# Patient Record
Sex: FEMALE | URBAN - METROPOLITAN AREA
[De-identification: names, ages, dates, MRNs, and addresses within clinical notes are randomized per-mention and may not be internally consistent; named-entity substitution may affect disease eponyms.]

---

## 2019-08-12 ENCOUNTER — OFFICE VISIT (OUTPATIENT)
Dept: ORTHOPEDICS | Facility: CLINIC | Age: 74
End: 2019-08-12
Payer: MEDICARE

## 2019-08-12 VITALS — WEIGHT: 154.19 LBS | TEMPERATURE: 98 F | HEIGHT: 62 IN | BODY MASS INDEX: 28.37 KG/M2

## 2019-08-12 DIAGNOSIS — M76.72 PERONEAL TENDINITIS OF LEFT LOWER EXTREMITY: Primary | ICD-10-CM

## 2019-08-12 PROCEDURE — 99201 PR OFFICE/OUTPT VISIT,NEW,LEVL I: CPT | Mod: 25,S$GLB,, | Performed by: ORTHOPAEDIC SURGERY

## 2019-08-12 PROCEDURE — 73600 PR  X-RAY ANKLE 2 VW: ICD-10-PCS | Mod: TC,LT,S$GLB, | Performed by: ORTHOPAEDIC SURGERY

## 2019-08-12 PROCEDURE — 73600 X-RAY EXAM OF ANKLE: CPT | Mod: TC,LT,S$GLB, | Performed by: ORTHOPAEDIC SURGERY

## 2019-08-12 PROCEDURE — 20550 NJX 1 TENDON SHEATH/LIGAMENT: CPT | Mod: LT,S$GLB,, | Performed by: ORTHOPAEDIC SURGERY

## 2019-08-12 PROCEDURE — 20550 TENDON SHEATH: L ANKLE: ICD-10-PCS | Mod: LT,S$GLB,, | Performed by: ORTHOPAEDIC SURGERY

## 2019-08-12 PROCEDURE — 99201 PR OFFICE/OUTPT VISIT,NEW,LEVL I: ICD-10-PCS | Mod: 25,S$GLB,, | Performed by: ORTHOPAEDIC SURGERY

## 2019-08-12 RX ORDER — AMOXICILLIN 500 MG
CAPSULE ORAL DAILY
COMMUNITY

## 2019-08-12 RX ORDER — UBIDECARENONE 75 MG
500 CAPSULE ORAL DAILY
COMMUNITY

## 2019-08-12 NOTE — PROCEDURES
Tendon Sheath: L ankle  Date/Time: 8/12/2019 2:50 PM  Performed by: Taye Grigsby MD  Authorized by: Taye Grigsby MD     Consent Done?:  Yes (Verbal)  Timeout: prior to procedure the correct patient, procedure, and site was verified    Indications:  Pain  Timeout: prior to procedure the correct patient, procedure, and site was verified    Location:  Ankle  Site:  L ankle (Peroneal tendon sheath)  Prep: patient was prepped and draped in usual sterile fashion    Ultrasonic guidance for needle placement?: No    Needle size:  27 G  Approach:  Lateral  Medications:  10 mg triamcinolone acetonide 10 mg/mL  Patient tolerance:  Patient tolerated the procedure well with no immediate complications

## 2019-08-12 NOTE — PROGRESS NOTES
Subjective:      Patient ID: Marcy Reddy is a 74 y.o. female.    Chief Complaint: Pain and Swelling of the Left Ankle    HPI 74-year-old with a five-month history of left lateral ankle pain and swelling. This is aggravated by walking.  She has tried icing without relief    Review of Systems   Constitution: Negative for fever and weight loss.   Cardiovascular: Negative for chest pain and leg swelling.   Musculoskeletal: Positive for joint pain, joint swelling and stiffness. Negative for arthritis and muscle weakness.   Gastrointestinal: Negative for change in bowel habit.   Genitourinary: Negative for bladder incontinence and hematuria.   Neurological: Negative for focal weakness, numbness, paresthesias and sensory change.         Objective:      Examination of the left ankle shows active and passive range of motion is normal. She has tenderness with palpable swelling of the peroneal tendon sheath as it passes behind the lateral malleolus and into the base of the 5th metatarsal.  She has no pathologic laxity    Ortho/SPM Exam            Assessment:       Encounter Diagnosis   Name Primary?    Peroneal tendinitis of left lower extremity Yes          Plan:       Marcy was seen today for pain and swelling.    Diagnoses and all orders for this visit:    Peroneal tendinitis of left lower extremity     AP and lateral of the left ankle is normal.    Impression is peroneal tendinitis.  The tendon sheath is injected today.  Return 2 weeks p.r.n.

## 2020-06-15 ENCOUNTER — OFFICE VISIT (OUTPATIENT)
Dept: ORTHOPEDICS | Facility: CLINIC | Age: 75
End: 2020-06-15
Payer: MEDICARE

## 2020-06-15 DIAGNOSIS — M76.72 PERONEAL TENDINITIS OF LEFT LOWER EXTREMITY: Primary | ICD-10-CM

## 2020-06-15 PROCEDURE — 99213 PR OFFICE/OUTPT VISIT, EST, LEVL III, 20-29 MIN: ICD-10-PCS | Mod: 25,S$GLB,, | Performed by: ORTHOPAEDIC SURGERY

## 2020-06-15 PROCEDURE — 99213 OFFICE O/P EST LOW 20 MIN: CPT | Mod: 25,S$GLB,, | Performed by: ORTHOPAEDIC SURGERY

## 2020-06-15 PROCEDURE — 20550 NJX 1 TENDON SHEATH/LIGAMENT: CPT | Mod: LT,S$GLB,, | Performed by: ORTHOPAEDIC SURGERY

## 2020-06-15 PROCEDURE — 20550 TENDON SHEATH: L ANKLE: ICD-10-PCS | Mod: LT,S$GLB,, | Performed by: ORTHOPAEDIC SURGERY

## 2020-06-15 NOTE — PROCEDURES
Tendon Sheath: L ankle    Date/Time: 6/15/2020 8:15 AM  Performed by: Taye Grigsby MD  Authorized by: Taye Grigsby MD     Consent Done?:  Yes (Verbal)  Indications:  Pain  Timeout: prior to procedure the correct patient, procedure, and site was verified    Prep: patient was prepped and draped in usual sterile fashion      Local anesthesia used?: Yes    Local anesthetic:  Lidocaine 2% without epinephrine  Anesthetic total (ml):  1    Location:  Ankle  Site:  L ankle (Peroneal tendon sheath)  Needle size:  27 G  Approach:  Lateral  Medications:  10 mg triamcinolone acetonide 10 mg/mL  Patient tolerance:  Patient tolerated the procedure well with no immediate complications

## 2020-06-15 NOTE — PROGRESS NOTES
Subjective:      Patient ID: Marcy Reddy is a 74 y.o. female.    Chief Complaint: Pain of the Left Ankle    HPI she comes in with recurrence of her left peroneal tendinitis.  She got about 10 months of relief from a last injection.  Her symptoms are aggravated by walking which she does for exercise    Review of Systems   Constitution: Negative for fever and weight loss.   Cardiovascular: Negative for chest pain and leg swelling.   Musculoskeletal: Negative for arthritis, joint pain, joint swelling, muscle weakness and stiffness.   Gastrointestinal: Negative for change in bowel habit.   Genitourinary: Negative for bladder incontinence and hematuria.   Neurological: Negative for focal weakness, numbness, paresthesias and sensory change.         Objective:      Active and passive range of motion of the left ankle is she has very mild swelling along the course of the peroneal tendons behind the lateral malleolus.  She has normal sensation and normal pulses.      Ortho/SPM Exam            Assessment:       Encounter Diagnosis   Name Primary?    Peroneal tendinitis of left lower extremity Yes          Plan:       Marcy was seen today for pain.    Diagnoses and all orders for this visit:    Peroneal tendinitis of left lower extremity      Peroneal tendon sheath is injected today.  Return p.r.n.  She is encouraged to ice her ankle down following exercise

## 2020-06-17 RX ORDER — METHYLPREDNISOLONE 4 MG/1
TABLET ORAL
Qty: 1 PACKAGE | Refills: 0 | Status: SHIPPED | OUTPATIENT
Start: 2020-06-17 | End: 2020-07-08

## 2020-11-12 ENCOUNTER — OFFICE VISIT (OUTPATIENT)
Dept: ORTHOPEDICS | Facility: CLINIC | Age: 75
End: 2020-11-12
Payer: MEDICARE

## 2020-11-12 VITALS — BODY MASS INDEX: 27.21 KG/M2 | HEIGHT: 61 IN | TEMPERATURE: 99 F | WEIGHT: 144.13 LBS

## 2020-11-12 DIAGNOSIS — M76.72 PERONEAL TENDINITIS OF LEFT LOWER EXTREMITY: Primary | ICD-10-CM

## 2020-11-12 PROCEDURE — 20605 INTERMEDIATE JOINT ASPIRATION/INJECTION: L ANKLE: ICD-10-PCS | Mod: LT,S$GLB,, | Performed by: ORTHOPAEDIC SURGERY

## 2020-11-12 PROCEDURE — 20605 DRAIN/INJ JOINT/BURSA W/O US: CPT | Mod: LT,S$GLB,, | Performed by: ORTHOPAEDIC SURGERY

## 2020-11-12 PROCEDURE — 99213 OFFICE O/P EST LOW 20 MIN: CPT | Mod: 25,S$GLB,, | Performed by: ORTHOPAEDIC SURGERY

## 2020-11-12 PROCEDURE — 99213 PR OFFICE/OUTPT VISIT, EST, LEVL III, 20-29 MIN: ICD-10-PCS | Mod: 25,S$GLB,, | Performed by: ORTHOPAEDIC SURGERY

## 2020-11-12 RX ORDER — HYOSCYAMINE SULFATE 0.125 MG
TABLET ORAL
COMMUNITY
Start: 2020-10-06

## 2020-11-12 NOTE — PROGRESS NOTES
Subjective:      Patient ID: Marcy Reddy is a 75 y.o. female.    Chief Complaint: Injury of the Left Ankle    HPI patient comes in with recurrence left ankle pain.  She only got about 2 months of relief from her last injection.  The pain is mostly along the anterior capsule and just anterior to the lateral border of the fibula    Review of Systems   Constitution: Negative for fever and weight loss.   Cardiovascular: Negative for chest pain and leg swelling.   Musculoskeletal: Positive for joint pain and joint swelling. Negative for arthritis, muscle weakness and stiffness.   Gastrointestinal: Negative for change in bowel habit.   Genitourinary: Negative for bladder incontinence and hematuria.   Neurological: Negative for focal weakness, numbness, paresthesias and sensory change.         Objective:      Active and passive range of motion of the left ankle is normal.  She has mild diffuse swelling.  She is tender  with palpation of the anterior capsule and anterior talofibular ligament.  She has no pathologic laxity.  She has normal sensation and pulses    Ortho/SPM Exam            Assessment:       Encounter Diagnosis   Name Primary?    Peroneal tendinitis of left lower extremity Yes          Plan:       Marcy was seen today for injury.    Diagnoses and all orders for this visit:    Peroneal tendinitis of left lower extremity      Her ankles injected today.  Return p.r.n..

## 2020-11-12 NOTE — PROCEDURES
Intermediate Joint Aspiration/Injection: L ankle    Date/Time: 11/12/2020 1:45 PM  Performed by: Taye Grigsby MD  Authorized by: Taye Grigsby MD     Consent Done?:  Yes (Verbal)  Indications:  Joint swelling and pain  Timeout: Prior to procedure the correct patient, procedure, and site was verified      Location:  Ankle  Site:  L ankle  Prep: Patient was prepped and draped in usual sterile fashion    Needle size:  25 G  Approach:  Anterolateral  Medications:  10 mg triamcinolone acetonide 10 mg/mL  Patient tolerance:  Patient tolerated the procedure well with no immediate complications

## 2020-12-01 ENCOUNTER — TELEPHONE (OUTPATIENT)
Dept: ORTHOPEDICS | Facility: CLINIC | Age: 75
End: 2020-12-01

## 2020-12-01 NOTE — TELEPHONE ENCOUNTER
12/1/20  3:20pm   Spoke w/ patient.    Patient is requesting MRI of left ankle.    The injection she received at last visit did not relieve symptoms.  She has no pain but c/o increased weakness.    Will order MRI and see patient w/ results.

## 2020-12-01 NOTE — TELEPHONE ENCOUNTER
----- Message from Carmel Manzano sent at 12/1/2020 10:40 AM CST -----  Regarding: pt  Pt would like speak with Natalie in regards to getting a MRI. Please call back [phone]

## 2020-12-02 DIAGNOSIS — M25.572 PAIN OF JOINT OF LEFT ANKLE AND FOOT: ICD-10-CM

## 2020-12-10 ENCOUNTER — OFFICE VISIT (OUTPATIENT)
Dept: ORTHOPEDICS | Facility: CLINIC | Age: 75
End: 2020-12-10
Payer: MEDICARE

## 2020-12-10 DIAGNOSIS — M76.72 PERONEAL TENDINITIS OF LEFT LOWER EXTREMITY: Primary | ICD-10-CM

## 2020-12-10 PROCEDURE — 99499 NO LOS: ICD-10-PCS | Mod: S$GLB,,, | Performed by: ORTHOPAEDIC SURGERY

## 2020-12-10 PROCEDURE — 99499 UNLISTED E&M SERVICE: CPT | Mod: S$GLB,,, | Performed by: ORTHOPAEDIC SURGERY

## 2020-12-10 NOTE — PROGRESS NOTES
Subjective:      Patient ID: Marcy Reddy is a 75 y.o. female.    Chief Complaint: No chief complaint on file.    HPI patient is here for review of her left ankle MRI.  She has no pain after her injections however she has a feeling of instability in her ankle    ROS unchanged from prior visit      Objective:      Unchanged from prior visit      Ortho/SPM Exam            Assessment:       Encounter Diagnosis   Name Primary?    Peroneal tendinitis of left lower extremity Yes          Plan:       Diagnoses and all orders for this visit:    Peroneal tendinitis of left lower extremity      MRI shows tearing of the peroneus longus and brevis.  She is placed in a brace.  Arrangements were made for brief course of therapy for stabilization exercises.  Return p.r.n.

## 2021-04-08 ENCOUNTER — OFFICE VISIT (OUTPATIENT)
Dept: ORTHOPEDICS | Facility: CLINIC | Age: 76
End: 2021-04-08
Payer: MEDICARE

## 2021-04-08 DIAGNOSIS — M76.72 PERONEAL TENDINITIS OF LEFT LOWER EXTREMITY: Primary | ICD-10-CM

## 2021-04-08 PROCEDURE — 20605 DRAIN/INJ JOINT/BURSA W/O US: CPT | Mod: LT,S$GLB,, | Performed by: ORTHOPAEDIC SURGERY

## 2021-04-08 PROCEDURE — 99213 OFFICE O/P EST LOW 20 MIN: CPT | Mod: 25,S$GLB,, | Performed by: ORTHOPAEDIC SURGERY

## 2021-04-08 PROCEDURE — 20605 INTERMEDIATE JOINT ASPIRATION/INJECTION: L ANKLE: ICD-10-PCS | Mod: LT,S$GLB,, | Performed by: ORTHOPAEDIC SURGERY

## 2021-04-08 PROCEDURE — 99213 PR OFFICE/OUTPT VISIT, EST, LEVL III, 20-29 MIN: ICD-10-PCS | Mod: 25,S$GLB,, | Performed by: ORTHOPAEDIC SURGERY

## 2021-07-26 DIAGNOSIS — M76.72 PERONEAL TENDINITIS OF LEFT LOWER EXTREMITY: Primary | ICD-10-CM
